# Patient Record
Sex: MALE | Race: WHITE | Employment: OTHER | URBAN - METROPOLITAN AREA
[De-identification: names, ages, dates, MRNs, and addresses within clinical notes are randomized per-mention and may not be internally consistent; named-entity substitution may affect disease eponyms.]

---

## 2017-02-22 ENCOUNTER — OFFICE VISIT (OUTPATIENT)
Dept: INTERNAL MEDICINE CLINIC | Facility: CLINIC | Age: 42
End: 2017-02-22

## 2017-02-22 VITALS
HEART RATE: 95 BPM | BODY MASS INDEX: 25.14 KG/M2 | WEIGHT: 189.69 LBS | DIASTOLIC BLOOD PRESSURE: 86 MMHG | HEIGHT: 73 IN | SYSTOLIC BLOOD PRESSURE: 137 MMHG

## 2017-02-22 DIAGNOSIS — M79.604 RIGHT LEG PAIN: Primary | ICD-10-CM

## 2017-02-22 PROCEDURE — 99213 OFFICE O/P EST LOW 20 MIN: CPT | Performed by: INTERNAL MEDICINE

## 2017-02-22 PROCEDURE — 99212 OFFICE O/P EST SF 10 MIN: CPT | Performed by: INTERNAL MEDICINE

## 2017-02-22 RX ORDER — NAPROXEN 500 MG/1
500 TABLET ORAL 2 TIMES DAILY WITH MEALS
Qty: 30 TABLET | Refills: 0 | Status: SHIPPED | OUTPATIENT
Start: 2017-02-22 | End: 2018-02-17

## 2017-02-22 NOTE — PROGRESS NOTES
Justina Scales is a 46year old male. Patient presents with:  Low Back Pain: Pt c/o low back pain that radiates down to the back of the right leg    HPI:   In late November 2016, he helped a friend move a piano.   A few days later, he began developing pa side  NEURO: Reflexes 2+ bilateral patellar and 2+ bilateral Achilles. Strength 5/5 bilateral lower extremities without weakness       ASSESSMENT AND PLAN:   1.  Right leg pain  Possible right lumbar radiculopathy though he has no pain below his proximal ri

## 2017-02-22 NOTE — PATIENT INSTRUCTIONS
Please rest and apply heat 2-3 times daily. Please take naproxen twice daily with meals. Call if not soon better as physical therapy may be needed.

## 2017-02-24 ENCOUNTER — HOSPITAL ENCOUNTER (OUTPATIENT)
Age: 42
Discharge: HOME | End: 2017-02-24
Attending: EMERGENCY MEDICINE
Payer: COMMERCIAL

## 2017-02-24 VITALS
BODY MASS INDEX: 24.52 KG/M2 | HEIGHT: 73 IN | TEMPERATURE: 99 F | HEART RATE: 100 BPM | RESPIRATION RATE: 16 BRPM | SYSTOLIC BLOOD PRESSURE: 144 MMHG | OXYGEN SATURATION: 99 % | WEIGHT: 185 LBS | DIASTOLIC BLOOD PRESSURE: 89 MMHG

## 2017-02-24 DIAGNOSIS — R30.0 DYSURIA: Primary | ICD-10-CM

## 2017-02-24 LAB
URINE BILIRUBIN: NEGATIVE
URINE BLOOD: NEGATIVE
URINE CLARITY: CLEAR
URINE GLUCOSE: NEGATIVE MG/DL
URINE KETONES: NEGATIVE MG/DL
URINE LEUKOCYTE ESTERASE: NEGATIVE
URINE NITRITE: NEGATIVE
URINE PH: 6.5
URINE SPECIFIC GRAVITY: 1.01
URINE UROBILINOGEN: 0.2 MG/DL

## 2017-02-24 PROCEDURE — 99214 OFFICE O/P EST MOD 30 MIN: CPT

## 2017-02-24 PROCEDURE — 96372 THER/PROPH/DIAG INJ SC/IM: CPT

## 2017-02-24 PROCEDURE — 87591 N.GONORRHOEAE DNA AMP PROB: CPT | Performed by: EMERGENCY MEDICINE

## 2017-02-24 PROCEDURE — 87491 CHLMYD TRACH DNA AMP PROBE: CPT | Performed by: EMERGENCY MEDICINE

## 2017-02-24 PROCEDURE — 99203 OFFICE O/P NEW LOW 30 MIN: CPT

## 2017-02-24 PROCEDURE — 81002 URINALYSIS NONAUTO W/O SCOPE: CPT

## 2017-02-24 RX ORDER — CEFTRIAXONE 500 MG/1
250 INJECTION, POWDER, FOR SOLUTION INTRAMUSCULAR; INTRAVENOUS ONCE
Status: COMPLETED | OUTPATIENT
Start: 2017-02-24 | End: 2017-02-24

## 2017-02-24 RX ORDER — AZITHROMYCIN 250 MG/1
1000 TABLET, FILM COATED ORAL ONCE
Status: COMPLETED | OUTPATIENT
Start: 2017-02-24 | End: 2017-02-24

## 2017-02-24 NOTE — ED PROVIDER NOTES
Patient Seen in: [facility] In [address] [place] [address]    History   Patient presents with:  Eval-G (gynecologic)    Stated Complaint: std check    HPI    Patient is a 57-year-old male with history of chlamydia ×1 in the past had unprotected interc 98.6 °F (37 °C) (Oral)  Resp 16  Ht 185.4 cm (6' 1\")  Wt 83.915 kg  BMI 24.41 kg/m2  SpO2 99%        Physical Exam    Alert male in no acute distress  Back: No CVA tenderness  Abdomen: Nontender nondistended  :  Testes descended bilaterally, nontender n

## 2017-02-24 NOTE — ED NOTES
Pt monitored for 20 minutes no unfavorable reaction to medication administered.   Instructed not have unprotected sex and follow up for further testing

## 2017-02-26 LAB
C TRACH DNA SPEC QL NAA+PROBE: NEGATIVE
N GONORRHOEA DNA SPEC QL NAA+PROBE: NEGATIVE

## 2017-05-31 ENCOUNTER — TELEPHONE (OUTPATIENT)
Dept: INTERNAL MEDICINE CLINIC | Facility: CLINIC | Age: 42
End: 2017-05-31

## 2017-05-31 ENCOUNTER — OFFICE VISIT (OUTPATIENT)
Dept: INTERNAL MEDICINE CLINIC | Facility: CLINIC | Age: 42
End: 2017-05-31

## 2017-05-31 VITALS
WEIGHT: 196.81 LBS | BODY MASS INDEX: 26 KG/M2 | SYSTOLIC BLOOD PRESSURE: 144 MMHG | HEART RATE: 82 BPM | DIASTOLIC BLOOD PRESSURE: 83 MMHG

## 2017-05-31 DIAGNOSIS — M54.31 SCIATICA OF RIGHT SIDE: Primary | ICD-10-CM

## 2017-05-31 DIAGNOSIS — I15.8 OTHER SECONDARY HYPERTENSION: ICD-10-CM

## 2017-05-31 PROCEDURE — 99212 OFFICE O/P EST SF 10 MIN: CPT | Performed by: INTERNAL MEDICINE

## 2017-05-31 PROCEDURE — 99214 OFFICE O/P EST MOD 30 MIN: CPT | Performed by: INTERNAL MEDICINE

## 2017-05-31 RX ORDER — METHOCARBAMOL 750 MG/1
750 TABLET, FILM COATED ORAL 3 TIMES DAILY PRN
Qty: 30 TABLET | Refills: 1 | Status: SHIPPED | OUTPATIENT
Start: 2017-05-31 | End: 2019-05-23 | Stop reason: ALTCHOICE

## 2017-05-31 NOTE — PROGRESS NOTES
Patient ID: Davion Jose is a 46year old male. Patient presents with:  Sciatica  Medication Request: refill on clobetasol .        HISTORY OF PRESENT ILLNESS:   Medication Request        Review of Systems    MEDICAL HISTORY:     Past Medical History coordination of care    Lashaun De La Vega MD  5/31/2017

## 2017-05-31 NOTE — PATIENT INSTRUCTIONS
1.  Get XR. 2.  Take meds as prescribed. Sciatica    Sciatica is a condition that causes pain in the lower back that spreads down into the buttock, hip, and leg. Sometimes the leg pain can happen without any back pain.  Sciatica happens when a spinal nerv · When in bed, try to find a position that is comfortable. A firm mattress is best. Try lying flat on your back with pillows under your knees. You can also try lying on your side with your knees bent up toward your chest and a pillow between your knees.   · © 2978-9547 The [address] [place] [address], [address] [place]. All rights reserved. This information is not intended as a substitute for professional medical care. Always follow your healthcare professional's instructions.   Exercises to 9. Abdominal contraction: Bend your knees and put your hands on your stomach. Tighten your stomach muscles. Hold for 5 seconds, then relax. Repeat 10 times. 10. Straight leg raise: Bend one leg at the knee and keep the other leg straight.  Tighten your sto 3. Pelvic tilt: Lie on the floor on your back with your knees bent at 90 degrees. Your feet should be flat on the floor. Inhale, exhale, then slowly contract your abdominal muscles bringing your navel toward your spine.  Let your pelvis rock back until your

## 2017-05-31 NOTE — PROGRESS NOTES
Patient ID: Cami Vasquez is a 46year old male. Patient presents with:  Sciatica  Medication Request: refill on clobetasol . HISTORY OF PRESENT ILLNESS:   HPI  Pt presents for low back pain.   Onset - 6 month(s) ago  Pain - 8/10  Nature of pain Sexual Activity: Not on file   Not on file  Other Topics Concern    Caffeine Concern Yes    Comment: 3 cups/day     Social History Narrative           PHYSICAL EXAM:      05/31/17  1132 05/31/17  1134   BP: 163/94 144/83   Pulse: 86 82   Weight: 196 lb

## 2017-06-02 ENCOUNTER — HOSPITAL ENCOUNTER (OUTPATIENT)
Dept: GENERAL RADIOLOGY | Facility: HOSPITAL | Age: 42
Discharge: HOME | End: 2017-06-02
Attending: INTERNAL MEDICINE
Payer: COMMERCIAL

## 2017-06-02 DIAGNOSIS — M54.31 SCIATICA OF RIGHT SIDE: ICD-10-CM

## 2017-06-02 PROCEDURE — 72110 X-RAY EXAM L-2 SPINE 4/>VWS: CPT | Performed by: INTERNAL MEDICINE

## 2017-06-14 ENCOUNTER — OFFICE VISIT (OUTPATIENT)
Dept: INTERNAL MEDICINE CLINIC | Facility: CLINIC | Age: 42
End: 2017-06-14

## 2017-06-14 VITALS
WEIGHT: 191.13 LBS | BODY MASS INDEX: 25 KG/M2 | DIASTOLIC BLOOD PRESSURE: 87 MMHG | HEART RATE: 101 BPM | SYSTOLIC BLOOD PRESSURE: 133 MMHG

## 2017-06-14 DIAGNOSIS — Z72.0 TOBACCO ABUSE: Chronic | ICD-10-CM

## 2017-06-14 DIAGNOSIS — M54.31 SCIATICA OF RIGHT SIDE: Primary | ICD-10-CM

## 2017-06-14 PROCEDURE — 99212 OFFICE O/P EST SF 10 MIN: CPT | Performed by: INTERNAL MEDICINE

## 2017-06-14 PROCEDURE — 99406 BEHAV CHNG SMOKING 3-10 MIN: CPT | Performed by: INTERNAL MEDICINE

## 2017-06-14 PROCEDURE — 99214 OFFICE O/P EST MOD 30 MIN: CPT | Performed by: INTERNAL MEDICINE

## 2017-06-14 NOTE — PATIENT INSTRUCTIONS
Sciatica    Sciatica is a condition that causes pain in the lower back that spreads down into the buttock, hip, and leg. Sometimes the leg pain can happen without any back pain.  Sciatica happens when a spinal nerve is irritated or has pressure put on it · When in bed, try to find a position that is comfortable. A firm mattress is best. Try lying flat on your back with pillows under your knees. You can also try lying on your side with your knees bent up toward your chest and a pillow between your knees.   · © 4486-6580 [address], [address] [place]. All rights reserved. This information is not intended as a substitute for professional medical care. Always follow your healthcare professional's instructions.

## 2017-06-14 NOTE — PROGRESS NOTES
Patient ID: Shirlene Barlow is a 46year old male. Patient presents with:  Sciatica: fup        HISTORY OF PRESENT ILLNESS:   HPI  Patient presents for above. Here for follow-up of right-sided sciatica. He is feeling about 50% better he states.   He i Rfl: 0  •  methocarbamol 750 MG Oral Tab, Take 1 tablet (750 mg total) by mouth 3 (three) times daily as needed. , Disp: 30 tablet, Rfl: 1    Allergies:No Known Allergies      Social History   Marital Status:   Spouse Name: N/A    Years of Education use the over-the-counter nicotine patches for now. · Told him cold turkey is usually the best and most effective way. · We will reevaluate on next visit but no prescription medications at this time. Return in about 1 month (around 7/14/2017).     Jorge

## 2017-11-30 RX ORDER — CLOBETASOL PROPIONATE 0.5 MG/G
OINTMENT TOPICAL
Qty: 45 G | Refills: 0 | Status: SHIPPED | OUTPATIENT
Start: 2017-11-30 | End: 2018-09-06

## 2017-11-30 NOTE — TELEPHONE ENCOUNTER
Pt former pt of Dr Marija Vicente requesting clobetasol refill for skin issue.     Refill Protocol Appointment Criteria  · Appointment scheduled in the past 6 months or in the next 3 months  Recent Outpatient Visits            5 months ago Sciatica of right side

## 2018-09-06 RX ORDER — CLOBETASOL PROPIONATE 0.5 MG/G
OINTMENT TOPICAL
Qty: 45 G | Refills: 0 | OUTPATIENT
Start: 2018-09-06

## 2018-09-06 RX ORDER — CLOBETASOL PROPIONATE 0.5 MG/G
OINTMENT TOPICAL
Qty: 45 G | Refills: 0 | Status: SHIPPED | OUTPATIENT
Start: 2018-09-06 | End: 2020-04-08

## 2018-09-06 NOTE — TELEPHONE ENCOUNTER
Pt called in for refill. He uses Clobetasol ointment as needed for psoriasis and is running low. He is not experiencing a flare-up, just needs a refill. Pt has not been seen since June 2017.  Pt was advised to schedule an OV, he declines at this time but i

## 2019-05-18 ENCOUNTER — TELEPHONE (OUTPATIENT)
Dept: SCHEDULING | Age: 44
End: 2019-05-18

## 2019-05-18 ENCOUNTER — WALK IN (OUTPATIENT)
Dept: URGENT CARE | Age: 44
End: 2019-05-18

## 2019-05-18 VITALS
DIASTOLIC BLOOD PRESSURE: 84 MMHG | HEIGHT: 73 IN | TEMPERATURE: 98.7 F | WEIGHT: 190 LBS | HEART RATE: 98 BPM | RESPIRATION RATE: 16 BRPM | SYSTOLIC BLOOD PRESSURE: 140 MMHG | BODY MASS INDEX: 25.18 KG/M2

## 2019-05-18 DIAGNOSIS — L03.011 CELLULITIS OF FINGER OF RIGHT HAND: Primary | ICD-10-CM

## 2019-05-18 PROCEDURE — X0943 AMG SELF PAY VISIT: HCPCS | Performed by: NURSE PRACTITIONER

## 2019-05-18 RX ORDER — CEPHALEXIN 500 MG/1
500 CAPSULE ORAL EVERY 6 HOURS
Qty: 28 CAPSULE | Refills: 0 | Status: SHIPPED | OUTPATIENT
Start: 2019-05-18 | End: 2019-05-25

## 2019-05-18 ASSESSMENT — ENCOUNTER SYMPTOMS
SINUS PRESSURE: 0
CHILLS: 0
WOUND: 0
APPETITE CHANGE: 0
RHINORRHEA: 0
FEVER: 0
COUGH: 0
SHORTNESS OF BREATH: 0
EYE REDNESS: 0
TROUBLE SWALLOWING: 0
EYE DISCHARGE: 0
CHEST TIGHTNESS: 0
WHEEZING: 0
EYE ITCHING: 0
SORE THROAT: 0
FACIAL SWELLING: 0
FATIGUE: 0

## 2019-05-23 ENCOUNTER — OFFICE VISIT (OUTPATIENT)
Dept: INTERNAL MEDICINE CLINIC | Facility: CLINIC | Age: 44
End: 2019-05-23

## 2019-05-23 VITALS
BODY MASS INDEX: 26.11 KG/M2 | HEIGHT: 73 IN | WEIGHT: 197 LBS | DIASTOLIC BLOOD PRESSURE: 80 MMHG | HEART RATE: 82 BPM | SYSTOLIC BLOOD PRESSURE: 122 MMHG | TEMPERATURE: 99 F

## 2019-05-23 DIAGNOSIS — L08.9 FINGER INFECTION: Primary | ICD-10-CM

## 2019-05-23 PROCEDURE — 99212 OFFICE O/P EST SF 10 MIN: CPT | Performed by: INTERNAL MEDICINE

## 2019-05-23 PROCEDURE — 99213 OFFICE O/P EST LOW 20 MIN: CPT | Performed by: INTERNAL MEDICINE

## 2019-05-23 RX ORDER — CLINDAMYCIN HYDROCHLORIDE 150 MG/1
CAPSULE ORAL
Refills: 0 | COMMUNITY
Start: 2019-05-20 | End: 2021-01-19 | Stop reason: ALTCHOICE

## 2019-05-23 NOTE — PROGRESS NOTES
Patient ID: Jovanna Recio is a 41year old male. Patient presents with:  Finger Pain: Right hand index finger infection, seen at ER in [place] on Sunday. HISTORY OF PRESENT ILLNESS:   HPI  Patient presents for above.   Here with infection of righ Tobacco Use      Smoking status: Current Every Day Smoker        Packs/day: 0.50        Types: Cigarettes      Smokeless tobacco: Never Used    Substance and Sexual Activity      Alcohol use: Yes        Comment: socially      Drug use: No      Sexual act · If no improvement over the next week then will send to hand specialist for incision and drainage.     Return in about 1 month (around 6/20/2019) for Complete physical.    Preeti Pete MD  5/23/2019

## 2020-04-08 RX ORDER — CLOBETASOL PROPIONATE 0.5 MG/G
OINTMENT TOPICAL
Qty: 45 G | Refills: 0 | Status: SHIPPED | OUTPATIENT
Start: 2020-04-08 | End: 2021-01-19

## 2020-04-08 NOTE — TELEPHONE ENCOUNTER
Pt requesting Clobetasol oint refill. States he uses med for psoriasis, only applies intermittently and has run out of med. Review pended refill request as it does not fall under a protocol.     Last Rx: 9/6/2018 by Dr. Humera Mcmullen: 5/23/19

## 2020-08-10 ENCOUNTER — TELEPHONE (OUTPATIENT)
Dept: INTERNAL MEDICINE CLINIC | Facility: CLINIC | Age: 45
End: 2020-08-10

## 2020-08-10 NOTE — TELEPHONE ENCOUNTER
Spoke with pt,  verified  Pt stated his boys was with his ex wife for vacation and his ex wife family were all sick and tested positive for covid.    Pt wants to know if he should get tested, pt denies direct contact with his ex wife family and his boys

## 2020-08-10 NOTE — TELEPHONE ENCOUNTER
Relayed Dr. Akshat Ortega message. Patient had no further questions at this time, and will call back if he starts to experience symptoms.

## 2021-01-19 ENCOUNTER — OFFICE VISIT (OUTPATIENT)
Dept: INTERNAL MEDICINE CLINIC | Facility: CLINIC | Age: 46
End: 2021-01-19

## 2021-01-19 VITALS
TEMPERATURE: 99 F | HEIGHT: 73 IN | HEART RATE: 112 BPM | BODY MASS INDEX: 25.71 KG/M2 | WEIGHT: 194 LBS | SYSTOLIC BLOOD PRESSURE: 159 MMHG | DIASTOLIC BLOOD PRESSURE: 84 MMHG

## 2021-01-19 DIAGNOSIS — H00.012 HORDEOLUM EXTERNUM OF RIGHT LOWER EYELID: Primary | ICD-10-CM

## 2021-01-19 PROBLEM — F17.210 CIGARETTE NICOTINE DEPENDENCE WITHOUT COMPLICATION: Status: ACTIVE | Noted: 2021-01-19

## 2021-01-19 PROBLEM — F17.210 CIGARETTE NICOTINE DEPENDENCE WITHOUT COMPLICATION: Status: RESOLVED | Noted: 2021-01-19 | Resolved: 2021-01-19

## 2021-01-19 PROBLEM — Z72.0 TOBACCO ABUSE: Chronic | Status: RESOLVED | Noted: 2017-06-14 | Resolved: 2021-01-19

## 2021-01-19 PROCEDURE — 3008F BODY MASS INDEX DOCD: CPT | Performed by: INTERNAL MEDICINE

## 2021-01-19 PROCEDURE — 3079F DIAST BP 80-89 MM HG: CPT | Performed by: INTERNAL MEDICINE

## 2021-01-19 PROCEDURE — 3077F SYST BP >= 140 MM HG: CPT | Performed by: INTERNAL MEDICINE

## 2021-01-19 PROCEDURE — 99213 OFFICE O/P EST LOW 20 MIN: CPT | Performed by: INTERNAL MEDICINE

## 2021-01-19 RX ORDER — CLOBETASOL PROPIONATE 0.5 MG/G
OINTMENT TOPICAL
Qty: 45 G | Refills: 0 | Status: SHIPPED | OUTPATIENT
Start: 2021-01-19

## 2021-01-19 NOTE — PROGRESS NOTES
Patient ID: Jose L Lakhani is a 39year old male. Patient presents with:  Sty: Pt reports stye on right eye noticed a month ago, pt reports using OTC treatment with no relief.  Pt denies any pain, or blurred vision        HISTORY OF PRESENT ILLNESS:   HP Substance and Sexual Activity      Alcohol use: Yes        Comment: socially      Drug use: No      Sexual activity: Not on file    Lifestyle      Physical activity        Days per week: Not on file        Minutes per session: Not on file      Stress: No ASSESSMENT/PLAN:   1. Hordeolum externum of right lower eyelid  · Warm compresses several times a day. · Stop wearing contact lenses. · Wear glasses. · If no improvement over the next several weeks and will send to ophthalmology.     Return in about 4 we

## 2021-01-19 NOTE — PATIENT INSTRUCTIONS
Sty (or Stye)  A sty is when the oil gland of the eyelid becomes inflamed. It may develop into an infection with a small pocket of pus (an abscess). This can cause pain, redness, and swelling.  In early stages, a sty is treated with antibiotic cream, eye © 6859-8928 The [address]. [address] [facility], [facility] [place]. All rights reserved. This information is not intended as a substitute for professional medical care. Always follow your healthcare professional's instructions.

## 2021-02-25 ENCOUNTER — OFFICE VISIT (OUTPATIENT)
Dept: INTERNAL MEDICINE CLINIC | Facility: CLINIC | Age: 46
End: 2021-02-25

## 2021-02-25 VITALS
HEIGHT: 73 IN | BODY MASS INDEX: 25.84 KG/M2 | DIASTOLIC BLOOD PRESSURE: 89 MMHG | TEMPERATURE: 98 F | SYSTOLIC BLOOD PRESSURE: 148 MMHG | WEIGHT: 195 LBS | HEART RATE: 92 BPM

## 2021-02-25 DIAGNOSIS — Z00.00 ANNUAL PHYSICAL EXAM: Primary | ICD-10-CM

## 2021-02-25 DIAGNOSIS — L40.9 PSORIASIS: ICD-10-CM

## 2021-02-25 DIAGNOSIS — D49.2 SKIN GROWTH: ICD-10-CM

## 2021-02-25 DIAGNOSIS — H00.012 HORDEOLUM EXTERNUM OF RIGHT LOWER EYELID: ICD-10-CM

## 2021-02-25 PROCEDURE — G0438 PPPS, INITIAL VISIT: HCPCS | Performed by: INTERNAL MEDICINE

## 2021-02-25 PROCEDURE — 3077F SYST BP >= 140 MM HG: CPT | Performed by: INTERNAL MEDICINE

## 2021-02-25 PROCEDURE — 3079F DIAST BP 80-89 MM HG: CPT | Performed by: INTERNAL MEDICINE

## 2021-02-25 PROCEDURE — 3008F BODY MASS INDEX DOCD: CPT | Performed by: INTERNAL MEDICINE

## 2021-02-25 PROCEDURE — 99396 PREV VISIT EST AGE 40-64: CPT | Performed by: INTERNAL MEDICINE

## 2021-02-25 NOTE — PROGRESS NOTES
Patient ID: Yudy Cook is a 39year old male. Patient presents with:  Physical       HISTORY OF PRESENT ILLNESS:   HPI  Patient presents for above. Here for annual physical.    History of psoriasis with scattered plaques throughout his body.   Caitlyn Rucker Worry: Not on file        Inability: Not on file      Transportation needs        Medical: Not on file        Non-medical: Not on file    Tobacco Use      Smoking status: Former Smoker        Packs/day: 0.50        Types: Cigarettes        Start date: 9/14 atraumatic. Mouth/Throat: Oropharynx is clear and moist. No oropharyngeal exudate. Eyes: Pupils are equal, round, and reactive to light. EOM are normal. No scleral icterus. Cardiovascular: Normal rate, regular rhythm and normal heart sounds.    Pu

## 2022-03-01 ENCOUNTER — OFFICE VISIT (OUTPATIENT)
Dept: INTERNAL MEDICINE CLINIC | Facility: CLINIC | Age: 47
End: 2022-03-01
Payer: COMMERCIAL

## 2022-03-01 VITALS
SYSTOLIC BLOOD PRESSURE: 152 MMHG | DIASTOLIC BLOOD PRESSURE: 80 MMHG | WEIGHT: 193.38 LBS | HEIGHT: 73 IN | HEART RATE: 93 BPM | BODY MASS INDEX: 25.63 KG/M2

## 2022-03-01 DIAGNOSIS — Z12.11 COLON CANCER SCREENING: ICD-10-CM

## 2022-03-01 DIAGNOSIS — Z00.00 ANNUAL PHYSICAL EXAM: Primary | ICD-10-CM

## 2022-03-01 DIAGNOSIS — R10.30 LOWER ABDOMINAL PAIN: ICD-10-CM

## 2022-03-01 DIAGNOSIS — L40.9 PSORIASIS: ICD-10-CM

## 2022-03-01 DIAGNOSIS — C44.519 BASAL CELL CARCINOMA (BCC) OF BACK: ICD-10-CM

## 2022-03-01 PROCEDURE — 3079F DIAST BP 80-89 MM HG: CPT | Performed by: INTERNAL MEDICINE

## 2022-03-01 PROCEDURE — 99396 PREV VISIT EST AGE 40-64: CPT | Performed by: INTERNAL MEDICINE

## 2022-03-01 PROCEDURE — G0438 PPPS, INITIAL VISIT: HCPCS | Performed by: INTERNAL MEDICINE

## 2022-03-01 PROCEDURE — 3008F BODY MASS INDEX DOCD: CPT | Performed by: INTERNAL MEDICINE

## 2022-03-01 PROCEDURE — 3077F SYST BP >= 140 MM HG: CPT | Performed by: INTERNAL MEDICINE

## 2023-03-23 ENCOUNTER — OFFICE VISIT (OUTPATIENT)
Dept: INTERNAL MEDICINE CLINIC | Facility: CLINIC | Age: 48
End: 2023-03-23

## 2023-03-23 VITALS
OXYGEN SATURATION: 98 % | WEIGHT: 196 LBS | SYSTOLIC BLOOD PRESSURE: 136 MMHG | HEART RATE: 94 BPM | DIASTOLIC BLOOD PRESSURE: 92 MMHG | BODY MASS INDEX: 25.98 KG/M2 | HEIGHT: 73 IN

## 2023-03-23 DIAGNOSIS — Z23 NEED FOR VACCINATION: ICD-10-CM

## 2023-03-23 DIAGNOSIS — L40.9 PSORIASIS: ICD-10-CM

## 2023-03-23 DIAGNOSIS — Z00.00 ANNUAL PHYSICAL EXAM: Primary | ICD-10-CM

## 2023-03-23 DIAGNOSIS — C44.519 BASAL CELL CARCINOMA (BCC) OF BACK: ICD-10-CM

## 2023-03-23 DIAGNOSIS — Z12.11 COLON CANCER SCREENING: ICD-10-CM

## 2023-03-23 PROCEDURE — 99396 PREV VISIT EST AGE 40-64: CPT | Performed by: INTERNAL MEDICINE

## 2023-03-23 PROCEDURE — 90677 PCV20 VACCINE IM: CPT | Performed by: INTERNAL MEDICINE

## 2023-03-23 PROCEDURE — 3080F DIAST BP >= 90 MM HG: CPT | Performed by: INTERNAL MEDICINE

## 2023-03-23 PROCEDURE — G0438 PPPS, INITIAL VISIT: HCPCS | Performed by: INTERNAL MEDICINE

## 2023-03-23 PROCEDURE — 3008F BODY MASS INDEX DOCD: CPT | Performed by: INTERNAL MEDICINE

## 2023-03-23 PROCEDURE — 3075F SYST BP GE 130 - 139MM HG: CPT | Performed by: INTERNAL MEDICINE

## 2023-03-23 PROCEDURE — 90715 TDAP VACCINE 7 YRS/> IM: CPT | Performed by: INTERNAL MEDICINE

## 2023-03-23 PROCEDURE — 90472 IMMUNIZATION ADMIN EACH ADD: CPT | Performed by: INTERNAL MEDICINE

## 2023-03-23 PROCEDURE — 90471 IMMUNIZATION ADMIN: CPT | Performed by: INTERNAL MEDICINE

## 2023-03-23 RX ORDER — CLOBETASOL PROPIONATE 0.5 MG/G
OINTMENT TOPICAL
Qty: 60 G | Refills: 3 | Status: SHIPPED | OUTPATIENT
Start: 2023-03-23

## 2023-03-23 RX ORDER — BETAMETHASONE DIPROPIONATE 0.5 MG/G
CREAM TOPICAL
Qty: 60 G | Refills: 3 | Status: SHIPPED | OUTPATIENT
Start: 2023-03-23

## 2023-03-23 NOTE — PROGRESS NOTES
Patient's initial blood pressure elevated, Dr. Monnie Goodpasture verbally informed. Per Dr. Monnie Goodpasture he will recheck blood pressure.

## 2023-05-22 ENCOUNTER — NURSE ONLY (OUTPATIENT)
Facility: CLINIC | Age: 48
End: 2023-05-22

## 2023-05-22 DIAGNOSIS — Z12.11 SCREEN FOR COLON CANCER: Primary | ICD-10-CM

## 2023-05-24 RX ORDER — POLYETHYLENE GLYCOL 3350, SODIUM CHLORIDE, SODIUM BICARBONATE, POTASSIUM CHLORIDE 420; 11.2; 5.72; 1.48 G/4L; G/4L; G/4L; G/4L
POWDER, FOR SOLUTION ORAL
Qty: 4000 ML | Refills: 0 | Status: SHIPPED | OUTPATIENT
Start: 2023-05-24

## 2023-05-24 NOTE — PROGRESS NOTES
Scheduling:  cln w/ IV or matthew w/ Dr. Manasa Baird  Dx: crc screening  trilyte split dose sent e-scribe

## 2023-08-07 ENCOUNTER — TELEPHONE (OUTPATIENT)
Facility: CLINIC | Age: 48
End: 2023-08-07

## 2023-08-07 NOTE — TELEPHONE ENCOUNTER
I spoke to the pt     I answered all of his questions regarding his bowel prep & he voices understanding    We talked about how he has to split the dose into 2 liters this evening and another 2 liters 6 hours prior to the procedure    He is aware his arrival time is 12:15 pm and the procedure time is 1:30 pm tomorrow    Pt is aware to not drink any fluid for 3 hours prior to his procedure

## 2023-08-08 ENCOUNTER — LAB REQUISITION (OUTPATIENT)
Dept: SURGERY | Age: 48
End: 2023-08-08
Payer: COMMERCIAL

## 2023-08-08 ENCOUNTER — SURGERY CENTER DOCUMENTATION (OUTPATIENT)
Age: 48
End: 2023-08-08

## 2023-08-08 DIAGNOSIS — Z12.11 SCREEN FOR COLON CANCER: ICD-10-CM

## 2023-08-08 PROCEDURE — 45385 COLONOSCOPY W/LESION REMOVAL: CPT | Performed by: INTERNAL MEDICINE

## 2023-08-08 PROCEDURE — 88305 TISSUE EXAM BY PATHOLOGIST: CPT | Performed by: INTERNAL MEDICINE

## 2023-08-08 NOTE — PROCEDURES
COLONOSCOPY REPORT    Angela Gunter     7/3/1975 Age 57year old   PCP Domingo Peters MD Endoscopist Le Edmond MD     Date of procedure: 23    Procedure: Colonoscopy w/random cold biopsy and hot snare polypectomy    Pre-operative diagnosis: screening, diarrhea and blood in the stool    Post-operative diagnosis: skip lesions with inflammation, large polyp, hemorrhoids    Medications: MAC sedation    Withdrawal time: 15 minutes    Procedure:  Informed consent was obtained from the patient after the risks of the procedure were discussed, including but not limited to bleeding, perforation, aspiration, infection, or possibility of a missed lesion. After discussions of the risks/benefits and alternatives to this procedure, as well as the planned sedation, the patient was placed in the left lateral decubitus position and begun on continuous blood pressure pulse oximetry and EKG monitoring and this was maintained throughout the procedure. Once an adequate level of sedation was obtained a digital rectal exam was completed. Then the lubricated tip of the [id number] diagnostic video colonoscope was inserted and advanced without difficulty to the cecum using the CO2 insufflation technique. The cecum was identified by localizing the trifold, the appendix and the ileocecal valve. Withdrawal was begun with thorough washing and careful examination of the colonic walls and folds. A routine second examination of the cecum/ascending colon was performed. Photodocumentation was obtained. The bowel prep was good. Views of the colon were excellent with washing. I then carefully withdrew the instrument from the patient who tolerated the procedure well. Complications: none. Findings:   1. 1 polyp(s) noted as follows:      A. 8 mm polyp in the sigmoid colon; pedunculated morphology; hot snare polypectomy and retrieved. 2. Diverticulosis: none appreciated.     3. Terminal ileum: the visualized mucosa appeared normal but a little erythema so biopsied. 4. A retroflexed view of the rectum revealed hemorrhoids. 5. The colonic mucosa showed erythema and friability in the descending/hepatic flexure and rectum-- skip lesions consistent with crohn's. Right colon, left colon and rectal biopsies taken. 6. GENIE: normal rectal tone, no masses palpated. Impression:   Possible crohns  Colon polyp    Recommend:  Await pathology, likely repeat colonoscopy in pending pathology in 3 years. The interval for the next colonoscopy will be determined after reviewing pathology. If new signs or symptoms develop, colonoscopy may need to be repeated sooner. High fiber diet. Monitor for blood in the stool. If having more than just tinge of blood, call office or go to the ER. Await biopsies    >>>If tissue was obtained and you have not received your pathology results either by phone or letter within 2 weeks, please call our office at [phone].     Specimens: TI biopsies, R colon biopsies, L colon biopsies, rectal biopsies and sigmoid polyp

## 2023-08-14 ENCOUNTER — TELEPHONE (OUTPATIENT)
Facility: CLINIC | Age: 48
End: 2023-08-14

## 2023-08-14 NOTE — TELEPHONE ENCOUNTER
Recall colonoscopy in 5 years per Dr. Adriana Taveras. Colonoscopy done on 8/8/23. Health maintenance updated and message sent to pt outreach to repeat colon in 5 years.

## 2023-08-14 NOTE — TELEPHONE ENCOUNTER
----- Message from Shwetha Jackson MD sent at 8/14/2023  3:28 PM CDT -----  GI staff: please place recall for colonoscopy in 5 years   R sided likely UC  Follow up in office with me to discuss treatment options  Given no significant symptoms can wait to start treatment until after discussion

## 2023-08-15 NOTE — TELEPHONE ENCOUNTER
RN called and spoke to pt. Discussed him of Dr. Rob Soares review of pathology and recommendations. Pt scheduled for clinic on 9/20/23. Date, time, and location verified with pt. Pt verbalized understanding.     Your Appointments      Wednesday September 20, 2023  1:00 PM  Follow Up Visit with MD Steven Holland, [address] [facility], [place] ([address]) [address] [facility]  [phone]

## 2023-08-28 ENCOUNTER — MED REC SCAN ONLY (OUTPATIENT)
Facility: CLINIC | Age: 48
End: 2023-08-28

## 2023-09-20 ENCOUNTER — OFFICE VISIT (OUTPATIENT)
Dept: GASTROENTEROLOGY | Facility: CLINIC | Age: 48
End: 2023-09-20

## 2023-09-20 VITALS
BODY MASS INDEX: 24.92 KG/M2 | HEIGHT: 73 IN | DIASTOLIC BLOOD PRESSURE: 98 MMHG | SYSTOLIC BLOOD PRESSURE: 158 MMHG | WEIGHT: 188 LBS

## 2023-09-20 DIAGNOSIS — K51.919 ULCERATIVE COLITIS WITH COMPLICATION, UNSPECIFIED LOCATION (HCC): Primary | ICD-10-CM

## 2023-09-20 DIAGNOSIS — D36.9 ADENOMATOUS POLYP: ICD-10-CM

## 2023-09-20 PROCEDURE — 3080F DIAST BP >= 90 MM HG: CPT | Performed by: INTERNAL MEDICINE

## 2023-09-20 PROCEDURE — 3077F SYST BP >= 140 MM HG: CPT | Performed by: INTERNAL MEDICINE

## 2023-09-20 PROCEDURE — 99214 OFFICE O/P EST MOD 30 MIN: CPT | Performed by: INTERNAL MEDICINE

## 2023-09-20 PROCEDURE — 3008F BODY MASS INDEX DOCD: CPT | Performed by: INTERNAL MEDICINE

## 2023-09-20 RX ORDER — BUDESONIDE 3 MG/1
9 CAPSULE, COATED PELLETS ORAL EVERY MORNING
Qty: 147 CAPSULE | Refills: 0 | Status: SHIPPED | OUTPATIENT
Start: 2023-09-20 | End: 2023-11-08

## 2023-09-20 NOTE — PATIENT INSTRUCTIONS
Left sided ulcerative colitis  Discussed treatment options and decided on budesonide  Get labs done ordered by primary and stool test and CRP, ESR ordered by me  Start budesonide 9 mg for 8 weeks and then give update on symptoms  Agree on decreasing alcohol and nicotine/quit smoking  Repeat labs in 4-5 months  Follow up in the office in 4-5 months

## 2023-09-20 NOTE — PROGRESS NOTES
[address] Gastroenterology  Clinic Follow-up Visit    Patient presents with: Follow - Up        Subjective/HPI:   Waleska Michele is a 57year old male, patient of Dr. Neva Walters with history of nicotine, psoriasis, who presents for abnormal colonoscopy and diarrhea    In office today, pt presents for f/u. Did start nicotine and stopped smoking and then started having diarrhea and had blood x1    Overall, the patient feels ok now. Denies any GI symptoms of abdominal pain, nausea, vomiting, change in bowel habits, dyspepsia, dysphagia, hematemesis, hematochezia, or melena. Patient has a bowel movement each day. Additionally there is no change of appetite, unexpected weight loss, and no reported history of chest pain or shortness of breath. Senior and sophomore-- two sons    Pertinent Surgical Hx:  - See additional surgical hx below  - No known issues with sedation.  - No known history of sleep apnea. Pertinent Family Hx:  - brother with psoriasis  - father with psoriasis    Prior endoscopies:  Colonoscopy  Impression:   Possible crohns  Colon polyp     Recommend:  Await pathology, likely repeat colonoscopy in pending pathology in 3 years. The interval for the next colonoscopy will be determined after reviewing pathology. If new signs or symptoms develop, colonoscopy may need to be repeated sooner. High fiber diet. Monitor for blood in the stool. If having more than just tinge of blood, call office or go to the ER. Await biopsies     >>>If tissue was obtained and you have not received your pathology results either by phone or letter within 2 weeks, please call our office at [phone].      Specimens: TI biopsies, R colon biopsies, L colon biopsies, rectal biopsies and sigmoid polyp    Social Hx:  - No smoking/etoh-- smoking quit 6 months   - Denies illicit drug use   - Occupation: [employer] (formerly [employer])  - NSAIDs/ASA use: PRN      History, Medications, Allergies, ROS:      Past Medical History:   Diagnosis Date    Anxiety     Cancer ([facility] [address]     Psoriasis       History reviewed. No pertinent surgical history. Family History   Problem Relation Age of Onset    Obesity Father     Cancer Paternal Grandmother         lung-smoker    Cancer Paternal Grandfather         lung-smoker    Other (heart problems) Other         paternal side of family      Social History:   Social History     Socioeconomic History    Marital status:    Tobacco Use    Smoking status: Former     Packs/day: 0     Types: Cigarettes     Start date: 9/14/2020     Quit date: 9/1/2020     Years since quitting: 3.0     Passive exposure: Never    Smokeless tobacco: Never   Vaping Use    Vaping Use: Every day   Substance and Sexual Activity    Alcohol use: Yes     Comment: socially    Drug use: No   Other Topics Concern    Caffeine Concern Yes     Comment: 3 cups/day        Medications (Active prior to today's visit):  Current Outpatient Medications   Medication Sig Dispense Refill    Betamethasone Dipropionate Aug 0.05 % External Cream Apply to aa on the arms and legs QAM for 14 days then PRN flares. 60 g 3    clobetasol 0.05 % External Ointment Apply to aa on the arms and legs nightly for 14 days then PRN for flares. 60 g 3    Clobetasol Propionate 0.05 % External Solution Apply to scalp nightly for 14 days then PRN for flares. 50 mL 3    tacrolimus 0.1 % External Ointment Apply topically to face and groin bid x 14 days, prn 60 g 3    PEG 3350-KCl-Na Bicarb-NaCl (TRILYTE) 420 g Oral Recon Soln Take prep as directed by gastro office. May substitute with Trilyte/generic equivalent if needed.  (Patient not taking: Reported on 9/20/2023) 4000 mL 0       Allergies:  No Known Allergies    ROS:   CONSTITUTIONAL:  negative for fevers, chills  EYES:  negative for change in vision  RESPIRATORY:  negative for  shortness of breath  CARDIOVASCULAR:  negative for  chest pain  GASTROINTESTINAL:  see HPI  GENITOURINARY:  negative for dysuria and hematuria   SKIN:  negative for  rash  ALLERGIC/IMMUNOLOGIC:  negative for hay fever allergies  ENDOCRINE:  negative for cold intolerance and heat intolerance  MUSCULOSKELETAL:  negative for  joint stiffness and joint swelling  BEHAVIOR/PSYCH:  negative for depressed mood    PHYSICAL EXAM:   Blood pressure (!) 158/98, height 6' 1\" (1.854 m), weight 188 lb (85.3 kg). Gen: patient appears comfortable and in no acute distress  HEENT: conjunctiva pink, the sclera appears anicteric, oropharynx clear, mucus membranes appear moist  GI: soft, non-tender exam in all quadrants without rigidity or guarding, non-distended, no abnormal bowel sounds noted, no masses are palpated  Skin: dry, warm, no jaundice  Ext: no cyanosis, clubbing or edema is evident. Neuro: Alert and oriented x4, and patient is having movements of all 4 extremities   Psych: Pt has a normal mood and affect, behavior is normal    Nursing note and vitals reviewed      Labs/Imaging:     Patient's labs and imaging were reviewed and discussed with patient today. See HPI and A&P for further details. .  ASSESSMENT/PLAN:     1. Newly diagnosed left-sided ulcerative colitis. Patient has had GI issues since he was young but since he quit smoking he noted more mucus on and off diarrhea and blood in the stool. He has since improved as he is cut down alcohol and monitoring his diet. Colonoscopy did show mild to moderate left-sided colitis on biopsies. Discussed options and treatment with patient and he would like to do less aggressive treatment and if possible minimal medications. Discussed trying budesonide to control inflammation and then can taper or start other medication. He will repeat labs in 4 to 5 months and follow-up in the office. Of note he does have psoriasis as does his father and brother. No known family history of IBD.       Recommend:  Left sided ulcerative colitis  Discussed treatment options and decided on budesonide  Get labs done ordered by primary and stool test and CRP, ESR ordered by me  Start budesonide 9 mg for 8 weeks and then give update on symptoms  Agree on decreasing alcohol and nicotine/quit smoking  Repeat labs in 4-5 months  Follow up in the office in 4-5 months        Orders This Visit:  No orders of the defined types were placed in this encounter.       Meds This Visit:  Requested Prescriptions      No prescriptions requested or ordered in this encounter       Imaging & Referrals:  None

## 2023-10-04 ENCOUNTER — TELEMEDICINE (OUTPATIENT)
Dept: TELEHEALTH | Age: 48
End: 2023-10-04
Payer: COMMERCIAL

## 2023-10-04 DIAGNOSIS — B34.9 ACUTE VIRAL SYNDROME: Primary | ICD-10-CM

## 2023-10-04 PROCEDURE — 99203 OFFICE O/P NEW LOW 30 MIN: CPT | Performed by: NURSE PRACTITIONER

## 2023-10-05 ENCOUNTER — TELEPHONE (OUTPATIENT)
Facility: CLINIC | Age: 48
End: 2023-10-05

## 2023-10-05 NOTE — TELEPHONE ENCOUNTER
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 He denies stomach/abdominal pain. He has headaches that go away with Advil  He also had a 100 degree fever he has had since Saturday along with chills. He has no appetite reports mouth feels chalky. He is able to tolerate liquids and has been drinking a lot of water. He had a little diarrhea yesterday. Denies any blood or mucus. No bowel movement today. He is not congested and does not have regular flu like symptoms. I advised to see if he could get seen right away either by PCP office or Urgent care as may want to rule out flu or meningitis. He told me he had telemedicine already visit yesterday. He told me the APRN he spoke to yesterday seemed to think it may be viral.      He is wondering if budesonide may have caused this, but has been on it a week. Denies any rash. Do you think he should continue taking it? He took a home COVID test which was negative. I advised ER if any worsening symptoms and let him know I located the APRN note and it advised the same.     Please advise    Thank you

## 2023-10-05 NOTE — TELEPHONE ENCOUNTER
I reviewed below complete message with the patient and he voiced understanding. Told me he is not going to the ER.

## 2023-10-05 NOTE — PROGRESS NOTES
Pastor Schlatter is a 57year old male. HPI:   Patient presents via Video Visit on Demand. Patient consents to conducting the visit virtually and acknowledges limitations without an in person examination. Patient states that he had an extremely demanding trip out of town beginning on 9/29/23,  Patient states that he and his son few to [place] to do several college visits. Patient states that on 9/30/23 he had an abrupt onset of headache, body aches, chills and fatigue with a temp of 101.0. Patient and son continued with their plans and patient powered through all of the activities and travel feeling very ill. Patient was regularly testing for COVID as recently as yesterday and continues to test Negative. His temp remained at 100.+ on his trip. Patient was taking Ibuprofen. Patient currently complains of a severe frontal headache, not caused by any URI sx. He has a feeling of mild 'heaviness' to his higher chest/tracheal region, but has no cough. He feels like it is hard to take a deep breath, but his is able to take a full breath. Denies any chest pain. No calf pain or swelling. No back pain. Patient denies any light-sensitivity, neck pain or stiffness. Denies any GI sx. He has no appetite but is forcing himself to eat and drink. Patient is concerned about a very important work obligation he has in about 5 days. Patient has been taking a single multi-symptom OTC medication with Ibuprofen 200 mg + Phenylephrine 5 mg + antihistamine. Current Outpatient Medications   Medication Sig Dispense Refill    budesonide 3 MG Oral Cap DR Particles Take 3 capsules (9 mg total) by mouth every morning. 147 capsule 0    PEG 3350-KCl-Na Bicarb-NaCl (TRILYTE) 420 g Oral Recon Soln Take prep as directed by gastro office. May substitute with Trilyte/generic equivalent if needed.  (Patient not taking: Reported on 9/20/2023) 4000 mL 0    Betamethasone Dipropionate Aug 0.05 % External Cream Apply to aa on the arms and legs QAM for 14 days then PRN flares. 60 g 3    clobetasol 0.05 % External Ointment Apply to aa on the arms and legs nightly for 14 days then PRN for flares. 60 g 3    Clobetasol Propionate 0.05 % External Solution Apply to scalp nightly for 14 days then PRN for flares. 50 mL 3    tacrolimus 0.1 % External Ointment Apply topically to face and groin bid x 14 days, prn 60 g 3      Past Medical History:   Diagnosis Date    Anxiety     Cancer ([facility] [address]     Psoriasis       Social History:  Social History     Socioeconomic History    Marital status:    Tobacco Use    Smoking status: Former     Packs/day: 0     Types: Cigarettes     Start date: 9/14/2020     Quit date: 9/1/2020     Years since quitting: 3.0     Passive exposure: Never    Smokeless tobacco: Never   Vaping Use    Vaping Use: Every day   Substance and Sexual Activity    Alcohol use: Yes     Comment: socially    Drug use: No   Other Topics Concern    Caffeine Concern Yes     Comment: 3 cups/day        REVIEW OF SYSTEMS:   GENERAL HEALTH: See HPI  SKIN: denies any unusual skin lesions or rashes  RESPIRATORY: denies shortness of breath with exertion  CARDIOVASCULAR: denies chest pain on exertion  GI: denies abdominal pain and denies heartburn  NEURO: + frontal headaches    EXAM:     Video Visit on Demand    GENERAL: well developed, well nourished,in no apparent distress. Patient is ill-appearing and appears uncomfortable due to the headache. Patient is still very pleasant and is able to provide an excellent HPI and ROS. He has no windedness or cough while providing extensive HPI and ROS. ASSESSMENT AND PLAN:     1. Acute viral syndrome      - Supportive measures discussed and listed in Patient Instructions  - Stop the multi-symptom cold/flu preparation.  - Take Ibuprofen 600 mg (Advil, Motrin) every 6 hours after food and take Acetaminophen (Tylenol) according to package instructions.    Advised patient to follow up with Dr. Barb Perez if not improving with each day. Advised to go directly to the ED for any worsening of symptoms. Consent given by patient to conduct the Video Visit. Approx 15 minutes spend in direct patient evaluation.

## 2023-10-05 NOTE — TELEPHONE ENCOUNTER
Would try tylenol instead of NSAIDs (ibuprofen, advil, aleve, aspirin). Can stop budesonide for now and then when feeling better from fever standpoint/viral infection and try again. Rarely budesonide can given fevers but with rash/itching so more likely viral infection.      If worsens, high fevers, rash then go to ER

## 2023-11-13 ENCOUNTER — TELEPHONE (OUTPATIENT)
Facility: CLINIC | Age: 48
End: 2023-11-13

## 2023-11-13 NOTE — TELEPHONE ENCOUNTER
1st reminder letter sent out via mail and [employer]NOW for the following:   Calprotectin, Fecal (Order #[id number]) on 9/20/23     C-Reactive Protein (Order #[id number]) on 9/20/23       Sed RateJenny (Automated) (Order #[id number]) on 9/20/23

## 2024-03-30 ENCOUNTER — HOSPITAL ENCOUNTER (OUTPATIENT)
Age: 49
Discharge: HOME | End: 2024-03-30
Payer: COMMERCIAL

## 2024-03-30 ENCOUNTER — APPOINTMENT (OUTPATIENT)
Dept: GENERAL RADIOLOGY | Age: 49
End: 2024-03-30
Attending: PHYSICIAN ASSISTANT
Payer: COMMERCIAL

## 2024-03-30 ENCOUNTER — TELEMEDICINE (OUTPATIENT)
Dept: TELEHEALTH | Age: 49
End: 2024-03-30
Payer: COMMERCIAL

## 2024-03-30 VITALS
RESPIRATION RATE: 20 BRPM | SYSTOLIC BLOOD PRESSURE: 160 MMHG | OXYGEN SATURATION: 98 % | TEMPERATURE: 98 F | HEART RATE: 108 BPM | DIASTOLIC BLOOD PRESSURE: 88 MMHG

## 2024-03-30 DIAGNOSIS — R06.2 WHEEZING: ICD-10-CM

## 2024-03-30 DIAGNOSIS — R50.9 FEVER IN ADULT: ICD-10-CM

## 2024-03-30 DIAGNOSIS — R05.8 PRODUCTIVE COUGH: Primary | ICD-10-CM

## 2024-03-30 DIAGNOSIS — J18.9 PNEUMONIA OF RIGHT UPPER LOBE DUE TO INFECTIOUS ORGANISM: ICD-10-CM

## 2024-03-30 DIAGNOSIS — R50.9 FEVER, UNSPECIFIED FEVER CAUSE: Primary | ICD-10-CM

## 2024-03-30 PROCEDURE — 99213 OFFICE O/P EST LOW 20 MIN: CPT | Performed by: PHYSICIAN ASSISTANT

## 2024-03-30 PROCEDURE — 71046 X-RAY EXAM CHEST 2 VIEWS: CPT | Performed by: PHYSICIAN ASSISTANT

## 2024-03-30 RX ORDER — AZITHROMYCIN 250 MG/1
TABLET, FILM COATED ORAL
Qty: 6 TABLET | Refills: 0 | Status: SHIPPED | OUTPATIENT
Start: 2024-03-30 | End: 2024-04-04

## 2024-03-30 RX ORDER — BENZONATATE 100 MG/1
CAPSULE ORAL 3 TIMES DAILY PRN
Qty: 30 CAPSULE | Refills: 0 | Status: SHIPPED | OUTPATIENT
Start: 2024-03-30 | End: 2024-04-29

## 2024-03-30 RX ORDER — AMOXICILLIN AND CLAVULANATE POTASSIUM 875; 125 MG/1; MG/1
1 TABLET, FILM COATED ORAL 2 TIMES DAILY
Qty: 14 TABLET | Refills: 0 | Status: SHIPPED | OUTPATIENT
Start: 2024-03-30 | End: 2024-04-06

## 2024-03-30 NOTE — ED PROVIDER NOTES
Patient Seen in: [facility]      History     Chief Complaint   Patient presents with    Cold     Entered by patient     Stated Complaint: Cold    Subjective:   HPI    CHIEF COMPLAINT: Cough, fever and chills     HISTORY OF PRESENT ILLNESS: Patient is a 48-year-old male who presents for evaluation of cough, fever and chills.  States that symptoms have been present for about a week.  He did a telehealth visit and was told he needs to go and have somebody listen to his lungs.  Patient states symptoms seem to be isolated to the chest.  He has a bit of a sore throat from coughing but no nasal congestion or runny nose.  No vomiting or diarrhea.  Denies history of asthma or reactive airway disease.     REVIEW OF SYSTEMS:  Constitutional: As above  Eyes: no discharge  ENT: no sore throat  Cardiovascular: no chest pain, no palpitations  Respiratory: As above  Gastrointestinal: no abdominal pain, no vomiting  Genitourinary: no hematuria  Musculoskeletal: no back pain  Skin: no rashes  Neurological: no headache     Otherwise a complete review of systems was obtained and other than the HPI was negative     The patient's medication list, past medical history and social history elements is as listed in today's nurse's notes are reviewed and agree. The patient's family history is reviewed and is noncontributory to the presenting problem, except as indicated as above.    Objective:   Past Medical History:   Diagnosis Date    Anxiety     Cancer (HCC)     Psoriasis               History reviewed. No pertinent surgical history.             Social History     Socioeconomic History    Marital status:    Tobacco Use    Smoking status: Former     Packs/day: 0     Types: Cigarettes     Start date: 9/14/2020     Quit date: 9/1/2020     Years since quitting: 3.5     Passive exposure: Never    Smokeless tobacco: Never   Vaping Use    Vaping Use: Every day   Substance and Sexual Activity    Alcohol use: Yes      Comment: socially    Drug use: No   Other Topics Concern    Caffeine Concern Yes     Comment: 3 cups/day              Review of Systems    Positive for stated complaint: Cold  Other systems are as noted in HPI.  Constitutional and vital signs reviewed.      All other systems reviewed and negative except as noted above.    Physical Exam     ED Triage Vitals [03/30/24 1213]   /88   Pulse 108   Resp 20   Temp 97.9 °F (36.6 °C)   Temp src Temporal   SpO2 98 %   O2 Device None (Room air)       Current:/88   Pulse 108   Temp 97.9 °F (36.6 °C) (Temporal)   Resp 20   SpO2 98%         Physical Exam    Vital signs and nursing notes reviewed  General Appearance: Patient is alert and oriented x4 in no acute distress  Eyes: pupils equal and round, reactive to light. No pallor or injection, no sclera icterus  Ears: Bilateral TMs and canals clear  Throat: There is no erythema or exudates, no tonsillar hypertrophy.  no trismus or stridor. Uvula midline. No phonation changes, patient handling secretions well. Mucous membranes moist.  Respiratory: there are no retractions, lungs are clear to auscultation  Cardiovascular: regular rate and rhythm  Neurological:  Grossly intact, no deficits.  Gait normal.  Skin:  warm and dry, no rashes.  No jaundice. Brisk capillary refill  Musculoskeletal: neck is supple, no lymphadenopathy, non tender. no meningeal signs.  Extremities are symmetrical, full range of motion  Psychiatric: calm and cooperative      ED Course   Labs Reviewed - No data to display          XR CHEST PA + LAT CHEST (CPT=71046)    Result Date: 3/30/2024  PROCEDURE:  XR CHEST PA + LAT CHEST (CPT=71046)  INDICATIONS:  Fever, sore throat, headache and cough  COMPARISON:  None.  TECHNIQUE:  PA and lateral chest radiographs were obtained.  PATIENT STATED HISTORY: (As transcribed by Technologist)  Pt c/o sore throat, headache and dry cough for over 1x week    FINDINGS:  LUNGS:  There is a large area of airspace  infiltrate and consolidation in the right upper lobe.  The left lung is clear. CARDIAC:  Heart size and vascularity are normal. MEDIASTINUM:  There is no mediastinal widening. PLEURA:  No pleural effusion or pneumothorax. BONES:  Normal for age.            CONCLUSION:  Large right upper lobe airspace infiltrate with consolidation and air bronchograms.  Findings are consistent with pneumonia proper clinical setting.  The consolidation should be followed to complete resolution to exclude any underlying lung lesions.   LOCATION:  [facility]   Dictated by (CST): Christiano Colin MD on 3/30/2024 at 1:39 PM     Finalized by (CST): Christiano Colin MD on 3/30/2024 at 1:40 PM            I personally reviewed the x-ray images.  There is a consolidation in the right upper lobe.    Differential diagnosis is viral URI such as COVID or flu, bronchitis, pneumonia       MDM      This 48-year-old male presents for evaluation of 1 week of cough, fever and chills.  Symptoms sounded flulike, however he was noticing nasal congestion and rhinorrhea.  For this reason chest x-ray was obtained.  Of the right upper lobe consolidation was noted.  Will treat with dual antibiotic therapy, using Augmentin given his history of vaping and a Z-Russell.  Tessalon Perles for cough management.  Close follow-up with primary care.  If there are any new, changing or worsening symptoms go to the ER.  Patient voiced understanding to the treatment plan.  All questions answered                                   Medical Decision Making  Amount and/or Complexity of Data Reviewed  Radiology: ordered and independent interpretation performed. Decision-making details documented in ED Course.    Risk  Prescription drug management.        Disposition and Plan     Clinical Impression:  1. Fever, unspecified fever cause    2. Pneumonia of right upper lobe due to infectious organism         Disposition:  Discharge  3/30/2024  1:43 pm    Follow-up:  Jorge Briscoe MD  [address] [facility] [address]  [phone]    In 1 week  for reevaluation          Medications Prescribed:  Current Discharge Medication List        START taking these medications    Details   azithromycin (ZITHROMAX Z-MANISHA) 250 MG Oral Tab 500 mg once followed by 250 mg daily x 4 days  Qty: 6 tablet, Refills: 0      amoxicillin clavulanate 875-125 MG Oral Tab Take 1 tablet by mouth 2 (two) times daily for 7 days.  Qty: 14 tablet, Refills: 0      benzonatate 100 MG Oral Cap Take 1-2 capsules (100-200 mg total) by mouth 3 (three) times daily as needed.  Qty: 30 capsule, Refills: 0

## 2024-03-30 NOTE — DISCHARGE INSTRUCTIONS
Take the antibiotics as directed.    Follow up with your primary doctor.  You will need a chest x-ray to ensure resolution of your pneumonia.  Your primary care provider can order this for you.    Stop smoking/vaping.    If you have new, changing or worsening symptoms, please go directly to the ER.

## 2024-03-30 NOTE — PROGRESS NOTES
Virtual/Telephone Check-In    Deon Garcia verbally consents to a Virtual/Telephone Check-In service on 03/30/24.  Patient has been referred to the [facility] website at [email or web address] to review the yearly Consent to Treat document.  Patient understands and accepts financial responsibility for any deductible, co-insurance and/or co-pays associated with this service.       Telehealth Verbal Consent   I conducted a telehealth visit with Deon Garcia today, 03/30/24, which was completed using two-way, real-time interactive audio and video communication. This has been done in good laura to provide continuity of care in the best interest of the provider-patient relationship, due to the COVID -19 public health crisis/national emergency where restrictions of face-to-face office visits are ongoing. Every conscious effort was taken to allow for sufficient and adequate time to complete the visit.  The patient was made aware of the limitations of the telehealth visit, including treatment limitations as no physical exam could be performed.  The patient was advised to call 911 or to go to the ER in case there was an emergency.  The patient was also advised of the potential privacy & security concerns related to the telehealth platform.   The patient was made aware of where to find [facility]'s notice of privacy practices, telehealth consent form and other related consent forms and documents.  which are located on the [facility] website. The patient verbally agreed to telehealth consent form, related consents and the risks discussed.    Lastly, the patient confirmed that they were in [place].   Included in this visit, time may have been spent reviewing labs, medications, radiology tests and decision making. Appropriate medical decision-making and tests are ordered as detailed in the plan of care above.  Coding/billing information is submitted for this visit based on complexity of care and/or time spent for the visit.    CHIEF  COMPLAINT:     Chief Complaint   Patient presents with    Cough       HPI:   Deon Garcia is a 48 year old male who presents for a video visit.  Reports sudden onset 3/24/24 of fever, body aches, chills, cough sore throat.  Reports sx have persisted; having low grade fever, night sweats, chest is now congested and having wheezing.  Cough prod of yellow/green mucus.  Denies nasal/sinus congestion. Denies SOB, chest pain, GI sx.   Taking Advil cold and sinus/ tylenol   COVID test taken since sx onset: neg on 3/28/24          Current Outpatient Medications   Medication Sig Dispense Refill    PEG 3350-KCl-Na Bicarb-NaCl (TRILYTE) 420 g Oral Recon Soln Take prep as directed by gastro office. May substitute with Trilyte/generic equivalent if needed. (Patient not taking: Reported on 9/20/2023) 4000 mL 0    Betamethasone Dipropionate Aug 0.05 % External Cream Apply to aa on the arms and legs QAM for 14 days then PRN flares. 60 g 3    clobetasol 0.05 % External Ointment Apply to aa on the arms and legs nightly for 14 days then PRN for flares. 60 g 3    Clobetasol Propionate 0.05 % External Solution Apply to scalp nightly for 14 days then PRN for flares. 50 mL 3    tacrolimus 0.1 % External Ointment Apply topically to face and groin bid x 14 days, prn 60 g 3      Past Medical History:   Diagnosis Date    Anxiety     Cancer (HCC)     Psoriasis       No past surgical history on file.      Social History     Socioeconomic History    Marital status:    Tobacco Use    Smoking status: Former     Packs/day: 0     Types: Cigarettes     Start date: 9/14/2020     Quit date: 9/1/2020     Years since quitting: 3.5     Passive exposure: Never    Smokeless tobacco: Never   Vaping Use    Vaping Use: Every day   Substance and Sexual Activity    Alcohol use: Yes     Comment: socially    Drug use: No   Other Topics Concern    Caffeine Concern Yes     Comment: 3 cups/day         REVIEW OF SYSTEMS:   GENERAL: fair appetite  HEENT: See  HPI  LUNGS:  See HPI  CARDIOVASCULAR: see HPI  GI: see HPI    EXAM:   General: Alert, Ill-appearing/sounding, and In no acute distress  Respiratory:   Speaking in full sentences comfortably  Normal work of breathing  Coughing during visit    Head: Normocephalic  Eyes: Conjunctiva clear  Nose: No obvious nasal discharge.  Mood: Affect appropriate    ASSESSMENT AND PLAN:   Deon Garcia is a 48 year old male who presents with symptoms that are consistent with    ASSESSMENT/PLAN:       Diagnoses and all orders for this visit:    Productive cough    Wheezing    Fever in adult      A higher level of care was recommended to pt d/t limitations of telehealth.   Referred to IC for in-person evaluation and treatment  Patient verbalized understanding of rationale for further evaluation and appeared stable upon discharge.      Face to face time spent on Video Visit: 8:04 min  Total Time spent on visit including reviewing history, ordering labs/medication, patient examination and education: 12 min

## 2024-06-18 DIAGNOSIS — L40.9 PSORIASIS: ICD-10-CM

## 2024-06-20 RX ORDER — BETAMETHASONE DIPROPIONATE 0.5 MG/G
CREAM TOPICAL
Qty: 50 G | Refills: 0 | OUTPATIENT
Start: 2024-06-20

## 2024-06-20 NOTE — TELEPHONE ENCOUNTER
Refill refused.  Have not seen in over one year.  Needs complete physical and update of his labs.

## 2024-06-20 NOTE — TELEPHONE ENCOUNTER
Please review. Protocol Failed; No Protocol    No future appointments.    Revel Body message sent to patient to schedule an office visit with Provider and/or  Routed to Patient  for assistance with appointment.   Requested Prescriptions   Pending Prescriptions Disp Refills    BETAMETHASONE DIPROPIONATE AUG 0.05 % External Cream [Pharmacy Med Name: BETAMETHASONE DIP 0.05% AUG CRM 50G] 50 g 0     Sig: APPLY TO THE AFFECTED AREA ON THE ARMS AND LEGS EVERY MORNING FOR 14 DAYS THEN AS NEEDED FLARES       There is no refill protocol information for this order              Recent Outpatient Visits              2 months ago Productive cough    [facility] [place], Virtual Visit Angie Felix APRN    Telemedicine    8 months ago Acute viral syndrome    [facility] [place], Virtual Visit Zahida Celestin APRN    Telemedicine    9 months ago Ulcerative colitis with complication, unspecified location (HCC)    [facility] Shoaib Alan [facility]    Office Visit    1 year ago Screen for colon cancer    [facility]    Nurse Only    1 year ago Annual physical exam    [facility] Jorge Briscoe MD    Office Visit

## 2024-06-21 ENCOUNTER — PATIENT MESSAGE (OUTPATIENT)
Dept: INTERNAL MEDICINE CLINIC | Facility: CLINIC | Age: 49
End: 2024-06-21

## 2024-06-21 DIAGNOSIS — L40.9 PSORIASIS: ICD-10-CM

## 2024-06-21 NOTE — TELEPHONE ENCOUNTER
Patient called, verified Name and . Following up on refill of betamethasone dipropionate. Relayed Dr. Briscoe's message below. Call transferred to  for scheduling.

## 2024-06-21 NOTE — TELEPHONE ENCOUNTER
From: Deon Garcia  To: Jorge Briscoe  Sent: 6/21/2024 4:09 PM CDT  Subject: Refill    Hi doc, I scheduled an office visit for early July. Can I get that refill? Trying to get beach ready with this psoriasis. Thanks!

## 2024-06-24 RX ORDER — BETAMETHASONE DIPROPIONATE 0.5 MG/G
CREAM TOPICAL
Qty: 60 G | Refills: 0 | Status: SHIPPED | OUTPATIENT
Start: 2024-06-24

## 2024-06-24 NOTE — TELEPHONE ENCOUNTER
Please review. Protocol Failed; No Protocol    Future Appointments   Date Time Provider Department Center   7/12/2024 12:30 PM Jorge Briscoe MD [facility]         Betamethasone Dipropionate Aug 0.05 % External Cream [Jorge Briscoe]      Patient Comment: I scheduled an appointment for early July. Can i have this refilled now? Trying to get beach ready. Please?    Requested Prescriptions   Pending Prescriptions Disp Refills    Betamethasone Dipropionate Aug 0.05 % External Cream 60 g 3     Sig: Apply to aa on the arms and legs QAM for 14 days then PRN flares.       There is no refill protocol information for this order            Future Appointments         Provider Department Appt Notes    In 2 weeks Jorge Briscoe MD [facility] px 3/23/23 , medication refills Policy advised  Patient declines sooner appts offered          Recent Outpatient Visits              2 months ago Productive cough    [facility], Virtual Visit Angie Felix APRN    Telemedicine    8 months ago Acute viral syndrome    [facility], Virtual Visit Zahida Celestin APRN    Telemedicine    9 months ago Ulcerative colitis with complication, unspecified location (HCC)    [facility]    Office Visit    1 year ago Screen for colon cancer    [facility]    Nurse Only    1 year ago Annual physical exam    [facility] Jorge Briscoe MD    Office Visit

## (undated) NOTE — MR AVS SNAPSHOT
[address]  [phone]               Thank you for choosing us for your health care visit with Sang Sanchez MD.  We are glad to serve you and happy to provide you with this summary of your and disks in your back can’t be seen on an X-ray. If the provider sees signs of a compressed nerve, you will need to schedule an MRI scan as an outpatient. Signs of a compressed nerve include loss of strength in a leg.   Most sciatica gets better with medic If X-rays were taken, a radiologist will look at them. You will be told of any new findings that may affect your care.   When to seek medical advice  Call your healthcare provider right away if any of these occur:  · Pain gets worse even after taking prescr Call [phone] for help. [employer] is NOT to be used for urgent needs. For medical emergencies, dial 911.            Visit [facility] online at  [email or web address]

## (undated) NOTE — MR AVS SNAPSHOT
[address] [place] [address]  [phone]               Thank you for choosing us for your health care visit with Lex No MD.  We are glad to serve you and happy to provide you with this summary of your you have any questions related to insurance coverage.          Reason for Today's Visit     Sciatica     Medication Request           Medical Issues Discussed Today     Sciatica of right side    -  Primary    Other secondary hypertension          Instructio · You may need to stay in bed the first few days. But as soon as possible, begin sitting up or walking. This will help you avoid problems that come from staying in bed for long periods. · When in bed, try to find a position that is comfortable.  A firm mat © 3540-5893 The [address] [place] [address], [address]. All rights reserved. This information is not intended as a substitute for professional medical care. Always follow your healthcare professional's instructions.   Exercises to Repeat 10 times. 10. Straight leg raise: Bend one leg at the knee and keep the other leg straight. Tighten your stomach muscles. Slowly lift your straight leg 6 to 12 inches off the floor and hold for up to 5 seconds. Repeat 10 times on each side.   Standi floor. Hold for 10 seconds while breathing smoothly. 4. Abdominal crunch: Perform a pelvic tilt (above) flattening your lower back against the floor.  Holding the tension in your abdominal muscles, take another breath and raise your shoulder blades off the information, go to [email or web address]. [email or web address]. org and click on the Sign Up Now link in the [employer] box. Enter your [employer] Activation Code exactly as it appears below along with your Zip Code and Date of Birth to complete the sign-up process.  If you do

## (undated) NOTE — ED AVS SNAPSHOT
[facility] in [place] [address]    Phone:  [phone]    Fax:  [phone]           Lila Crump   MRN: [id number]    Department:  [facility] in [address] [place] [address]   Date of Visit not participate in your health insurance plan. This may result in a lower benefit level being available to you or other limited reimbursement.   The physician may seek payment directly from you for amounts other than your deductible, co-payment, or co-insu prescription right away and begin taking the medication(s) as directed.   If you believe that any of the medications or instructions on this list is different from what your Primary Care doctor has instructed you - please continue to take your medications a - If you don’t have insurance, Tamara Lombardo has partnered with [employer] [facility] [employer] to help you get signed up for insurance coverage.   [employer] [facility] [employer] is a Federal Navigator program that can help with your Affordable Care Act cover

## (undated) NOTE — LETTER
11/13/2023 Sunday Knee        [address] [facility]         Dear Miriam Tao records indicate that the tests ordered for you by Sunita Wood MD  have not been done. Calprotectin, Fecal (Order #[id number]) on 9/20/23     C-Reactive Protein (Order #[id number]) on 9/20/23       Sed Rate, [place] (Automated) (Order #[id number]) on 9/20/23       If you have, in fact, already completed the tests or you do not wish to have the tests done, please contact our office at [address] [place] [address]. Otherwise, please proceed with the testing.           Sincerely,    Sunita Wood MD  [facility], [address] [address] [phone]